# Patient Record
(demographics unavailable — no encounter records)

---

## 2018-12-22 NOTE — RAD
CHEST PA   LATERAL

 

CLINICAL INDICATION: Cough x5 days

 

COMPARISON: None

 

FINDINGS:  

 

Heart is normal in size. Central bilateral peribronchial wall thickening 

seen. No focal consolidation. No pneumothorax or pleural effusion. 

Visualized bony thorax is within normal limits.

 

IMPRESSION: 

 

Findings suggest Acute bronchitis.

 

Electronically signed by: Leon Meza DO (12/22/2018 9:33 PM) [facility]

## 2018-12-22 NOTE — PHYS DOC
Past History


Past Medical History:  No Pertinent History


Past Surgical History:  No Surgical History


Smoking:  Non-smoker


Alcohol Use:  None


Drug Use:  None





General Pediatric Assessment


Chief Complaint


Cough


History of Present Illness





Patient is a 5 year 9 month old female who presents with complaint of cough. 

Symptoms started 5 days ago. Patient has had harsh cough. Seen by primary 

doctor and was started on albuterol for treatment of wheezing. Patient has had 

occasional episodes of staccato coughing which has resulted in one episode of 

emesis. Denies any fevers today but has had fevers off-and-on during the week. 

Patient has been eating and drinking normal amounts at home. Denies any chest 

pain or abdominal pain and has had no loose stools.





Historian was the mother and patient.


Review of Systems





Constitutional: Fever[]


Eyes: Denies change in visual acuity, redness, or eye pain []


HENT: Denies nasal congestion or sore throat []


Respiratory: Cough, posttussive emesis[]


Cardiovascular: Denies palpitations or swelling[]


GI: Denies abdominal pain, nausea, vomiting, bloody stools or diarrhea []


: Denies dysuria or hematuria []


Musculoskeletal: Denies back pain or joint pain []


Integument: Denies rash or skin lesions []


Neurologic: Denies headache, focal weakness or sensory changes []








All other systems were reviewed and found to be within normal limits, except as 

documented in this note.


Allergies





Allergies








Coded Allergies Type Severity Reaction Last Updated Verified


 


  No Known Drug Allergies    18 No








Physical Exam





Constitutional: Alert, afebrile, no acute distress.


HENT: Normocephalic, atraumatic, bilateral external ears normal, oropharynx 

moist, no oral exudates, nose clear rhinorrhea.


Eyes: PERLL, EOMI, conjunctiva normal, no discharge.


Neck: Normal range of motion, no tenderness, supple, no stridor.


Cardiovascular: Normal heart rate, normal rhythm, no murmurs, no rubs, no 

gallops.


Thorax and Lungs: Rhonchi bilaterally, no respiratory distress, no chest 

tenderness, no retractions, no accessory muscle use.


Abdomen: Bowel sounds normal, soft, no tenderness, no masses, no pulsatile 

masses.


Skin: Warm, dry, no erythema, no rash.


Back: No tenderness, no CVA tenderness.


Extremeties: Intact distal pulses, no tenderness, no cyanosis, no clubbing, ROM 

intact, no edema. 


Musculoskeletal: Good ROM in all major joints, no tenderness to palpation or 

major deformities noted. 


Neurologic: Alert and oriented X 3, normal motor function, normal sensory 

function, no focal deficits noted.


Radiology/Procedures


[facility] [address], [place] [address]


 [phone]


 


 IMAGING REPORT





 Signed





PATIENT: SHANTELL SANDY ACCOUNT: [id number] MRN#: [id number]


: 2013 LOCATION: ER AGE: 5Y 09M


SEX: F EXAM DT: 18 ACCESSION#: [id number]


STATUS: REG ER ORD. PHYSICIAN: IVANNA EVANS MD 


REASON: cough for 5 days


PROCEDURE: CHEST PA & LATERAL





CHEST PA   LATERAL


 


CLINICAL INDICATION: Cough x5 days


 


COMPARISON: None


 


FINDINGS:  


 


Heart is normal in size. Central bilateral peribronchial wall thickening 


seen. No focal consolidation. No pneumothorax or pleural effusion. 


Visualized bony thorax is within normal limits.


 


IMPRESSION: 


 


Findings suggest Acute bronchitis.


 


Electronically signed by: Leon Meza DO (2018 9:33 PM) [facility]














DICTATED AND SIGNED BY:     LEON MEZA DO


DATE:     18





CC: IVANNA EVANS MD; TYLER NAVARRETE ~


[]


Current Patient Data





Vital Signs








  Date Time  Temp Pulse Resp B/P (MAP) Pulse Ox O2 Delivery O2 Flow Rate FiO2


 


18 20:40 98.7    97   








Vital Signs








  Date Time  Temp Pulse Resp B/P (MAP) Pulse Ox O2 Delivery O2 Flow Rate FiO2


 


18 20:40 98.7    97   








Vital Signs








  Date Time  Temp Pulse Resp B/P (MAP) Pulse Ox O2 Delivery O2 Flow Rate FiO2


 


18 20:40 98.7    97   








Course & Med Decision Making


Pertinent Labs and Imaging studies reviewed. (See chart for details)





Chest x-ray shows evidence of acute bronchitis. Patient given prescription for 

azithromycin for 5 day course of treatment. Advised follow-up in 5-7 days with 

primary doctor for reevaluation and return to emergency department for any 

worsening symptoms. Mother voiced understanding and in agreement with treatment 

plan.[]





Departure


Departure:


Impression:  


 Primary Impression:  


 Acute bronchitis


Disposition:  01 HOME, SELF-CARE


Condition:  STABLE


Referrals:  


TYLER NAVARRETE (PCP)


Patient Instructions:  Bronchitis





Additional Instructions:  


Follow-up with your child's pediatrician in 5 days if symptoms are not 

improving. Return to emergency department for any worsening symptoms.


Scripts


Azithromycin (AZITHROMYCIN ORAL SUSP) 100 Mg/5 Ml Susp.recon


8.7 ML PO UD for 5 Days, #30 ML


   Take 8.7 mL by mouth once on day 1, then take 4.4 mL by


   mouth daily on days 2 through 5. Total of 5 days treatment.


   Prov: IVANNA EVANS MD         18





Problem Qualifiers








 Primary Impression:  


 Acute bronchitis


 Bronchitis organism:  unspecified organism  Qualified Codes:  J20.9 - Acute 

bronchitis, unspecified








IVANNA EVANS MD Dec 22, 2018 22:04

## 2018-12-24 NOTE — PHYS DOC
Past History


Past Medical History:  No Pertinent History


Past Surgical History:  No Surgical History


Smoking:  Non-smoker


Alcohol Use:  None


Drug Use:  None





Adult General


Chief Complaint


Chief Complaint:  SKIN RASH/ABSCESS





HPI


HPI





Patient is a 5-year-old female who presents with complaint of rash that started 

last night. Patient was recently seen and diagnosed with bronchitis and was 

started on azithromycin 2 days ago. Patient does indicate that rash is very 

itchy. She denies any fever.





Review of Systems


Review of Systems





Constitutional: Denies fever or chills []


HENT: Complains of nasal congestion []


Respiratory: Complains of cough without shortness of breath []


Integument: Complains of diffuse body rash []


Neurologic: Denies headache, focal weakness or sensory changes []





Current Medications


Current Medications





Current Medications








 Medications


  (Trade)  Dose


 Ordered  Sig/Lavell  Start Time


 Stop Time Status Last Admin


Dose Admin


 


 Amoxicillin


  (Amoxicillin


 Oral Susp)  250 mg  1X  ONCE  12/24/18 17:30


 12/24/18 17:32 DC  





 


 Diphenhydramine


 HCl


  (Benadryl Oral


 Elixir)  6.25 mg  1X  ONCE  12/24/18 17:30


 12/24/18 17:32 DC  














Allergies


Allergies





Allergies








Coded Allergies Type Severity Reaction Last Updated Verified


 


  No Known Drug Allergies    12/22/18 No











Physical Exam


Physical Exam





Constitutional: Well developed, well nourished, no acute distress, non-toxic 

appearance. []


HENT: Normocephalic, atraumatic, bilateral external ears normal, oropharynx 

moist, no oral exudates, nose normal. []


Neck: Normal range of motion, no tenderness, supple. [] 


Cardiovascular: Regular rate and rhythm []


Lungs & Thorax:  Bilateral breath sounds clear to auscultation []


Skin: There is a fine diffuse papular rash. []





Current Patient Data


Vital Signs





 Vital Signs








  Date Time  Temp Pulse Resp B/P (MAP) Pulse Ox O2 Delivery O2 Flow Rate FiO2


 


12/24/18 16:50 98.1    97   











EKG


EKG


[]





Radiology/Procedures


Radiology/Procedures


[]





Course & Med Decision Making


Course & Med Decision Making


Pertinent Labs and Imaging studies reviewed. (See chart for details)





Suspect viral exanthem versus drug rash.





Dragon Disclaimer


Dragon Disclaimer


This electronic medical record was generated, in whole or in part, using a 

voice recognition dictation system.





Departure


Departure:


Impression:  


 Primary Impression:  


 Drug-induced skin rash


Disposition:  01 HOME, SELF-CARE


Condition:  STABLE


Referrals:  


TYLER NAVARRETE (PCP)


Patient Instructions:  Drug Rash


Scripts


Diphenhydramine Hcl (BENADRYL ALLERGY) 12.5 Mg/5 Ml Liquid


2.5 ML PO Q6HRS PRN for ITCHING, #120 ML


   Prov: DOROTA KOLB Jr. DO         12/24/18 


Amoxicillin (AMOXICILLIN) 250 Mg/5 Ml Susp.recon


7.5 ML PO BID for infection, #150 ML


   Prov: DOROTA KOLB Jr. DO         12/24/18











DOROTA KOLB Jr. DO Dec 24, 2018 17:41